# Patient Record
Sex: MALE | Race: BLACK OR AFRICAN AMERICAN | NOT HISPANIC OR LATINO
[De-identification: names, ages, dates, MRNs, and addresses within clinical notes are randomized per-mention and may not be internally consistent; named-entity substitution may affect disease eponyms.]

---

## 2021-02-19 ENCOUNTER — APPOINTMENT (OUTPATIENT)
Dept: VASCULAR SURGERY | Facility: CLINIC | Age: 51
End: 2021-02-19
Payer: MEDICAID

## 2021-02-19 VITALS
WEIGHT: 140 LBS | BODY MASS INDEX: 21.97 KG/M2 | HEART RATE: 67 BPM | HEIGHT: 67 IN | DIASTOLIC BLOOD PRESSURE: 65 MMHG | TEMPERATURE: 97.2 F | SYSTOLIC BLOOD PRESSURE: 104 MMHG | OXYGEN SATURATION: 99 %

## 2021-02-19 PROBLEM — Z00.00 ENCOUNTER FOR PREVENTIVE HEALTH EXAMINATION: Status: ACTIVE | Noted: 2021-02-19

## 2021-02-19 PROCEDURE — 93931 UPPER EXTREMITY STUDY: CPT | Mod: 59

## 2021-02-19 PROCEDURE — 99203 OFFICE O/P NEW LOW 30 MIN: CPT

## 2021-02-19 PROCEDURE — 99072 ADDL SUPL MATRL&STAF TM PHE: CPT

## 2021-02-19 PROCEDURE — 93986 DUP-SCAN HEMO COMPL UNI STD: CPT

## 2021-02-19 NOTE — PHYSICAL EXAM
[2+] : left 2+ [Normal] : normoactive bowel sounds, soft and nontender, no hepatosplenomegaly or masses appreciated [Redness surrounding] : no redness surrounding [Drainage] : no drainage

## 2021-02-19 NOTE — HISTORY OF PRESENT ILLNESS
[FreeTextEntry1] : Patient with history of hypertension, renal failure who has been on dialysis for the past 1 month through a right-sided tunneled catheter.  Patient is left-handed.  No significant cardiac history.  Patient smokes 5 or 6 cigarettes a day.

## 2021-02-19 NOTE — ASSESSMENT
[FreeTextEntry1] : Patient with renal failure.  Plan for right radiocephalic fistula.  Needs medical clearance.

## 2021-07-06 ENCOUNTER — RESULT REVIEW (OUTPATIENT)
Age: 51
End: 2021-07-06

## 2021-07-06 ENCOUNTER — OUTPATIENT (OUTPATIENT)
Dept: OUTPATIENT SERVICES | Facility: HOSPITAL | Age: 51
LOS: 1 days | End: 2021-07-06
Payer: MEDICAID

## 2021-07-06 VITALS
SYSTOLIC BLOOD PRESSURE: 138 MMHG | DIASTOLIC BLOOD PRESSURE: 99 MMHG | WEIGHT: 138.01 LBS | HEIGHT: 65 IN | TEMPERATURE: 98 F | OXYGEN SATURATION: 100 % | HEART RATE: 56 BPM | RESPIRATION RATE: 16 BRPM

## 2021-07-06 DIAGNOSIS — N18.6 END STAGE RENAL DISEASE: ICD-10-CM

## 2021-07-06 DIAGNOSIS — Z98.890 OTHER SPECIFIED POSTPROCEDURAL STATES: Chronic | ICD-10-CM

## 2021-07-06 DIAGNOSIS — I10 ESSENTIAL (PRIMARY) HYPERTENSION: ICD-10-CM

## 2021-07-06 DIAGNOSIS — Z01.818 ENCOUNTER FOR OTHER PREPROCEDURAL EXAMINATION: ICD-10-CM

## 2021-07-06 DIAGNOSIS — Z29.9 ENCOUNTER FOR PROPHYLACTIC MEASURES, UNSPECIFIED: ICD-10-CM

## 2021-07-06 DIAGNOSIS — Z95.828 PRESENCE OF OTHER VASCULAR IMPLANTS AND GRAFTS: Chronic | ICD-10-CM

## 2021-07-06 LAB
ANION GAP SERPL CALC-SCNC: 5 MMOL/L — SIGNIFICANT CHANGE UP (ref 5–17)
BUN SERPL-MCNC: 43 MG/DL — HIGH (ref 7–18)
CALCIUM SERPL-MCNC: 8.9 MG/DL — SIGNIFICANT CHANGE UP (ref 8.4–10.5)
CHLORIDE SERPL-SCNC: 104 MMOL/L — SIGNIFICANT CHANGE UP (ref 96–108)
CO2 SERPL-SCNC: 31 MMOL/L — SIGNIFICANT CHANGE UP (ref 22–31)
CREAT SERPL-MCNC: 9.81 MG/DL — HIGH (ref 0.5–1.3)
GLUCOSE SERPL-MCNC: 50 MG/DL — CRITICAL LOW (ref 70–99)
HCT VFR BLD CALC: 36.6 % — LOW (ref 39–50)
HGB BLD-MCNC: 11.8 G/DL — LOW (ref 13–17)
MCHC RBC-ENTMCNC: 32.2 GM/DL — SIGNIFICANT CHANGE UP (ref 32–36)
MCHC RBC-ENTMCNC: 32.8 PG — SIGNIFICANT CHANGE UP (ref 27–34)
MCV RBC AUTO: 101.7 FL — HIGH (ref 80–100)
NRBC # BLD: 0 /100 WBCS — SIGNIFICANT CHANGE UP (ref 0–0)
PLATELET # BLD AUTO: 138 K/UL — LOW (ref 150–400)
POTASSIUM SERPL-MCNC: 4.6 MMOL/L — SIGNIFICANT CHANGE UP (ref 3.5–5.3)
POTASSIUM SERPL-SCNC: 4.6 MMOL/L — SIGNIFICANT CHANGE UP (ref 3.5–5.3)
RBC # BLD: 3.6 M/UL — LOW (ref 4.2–5.8)
RBC # FLD: 13.2 % — SIGNIFICANT CHANGE UP (ref 10.3–14.5)
SODIUM SERPL-SCNC: 140 MMOL/L — SIGNIFICANT CHANGE UP (ref 135–145)
WBC # BLD: 6.16 K/UL — SIGNIFICANT CHANGE UP (ref 3.8–10.5)
WBC # FLD AUTO: 6.16 K/UL — SIGNIFICANT CHANGE UP (ref 3.8–10.5)

## 2021-07-06 PROCEDURE — 71045 X-RAY EXAM CHEST 1 VIEW: CPT | Mod: 26

## 2021-07-06 PROCEDURE — 36415 COLL VENOUS BLD VENIPUNCTURE: CPT

## 2021-07-06 PROCEDURE — 85027 COMPLETE CBC AUTOMATED: CPT

## 2021-07-06 PROCEDURE — 80048 BASIC METABOLIC PNL TOTAL CA: CPT

## 2021-07-06 PROCEDURE — 87641 MR-STAPH DNA AMP PROBE: CPT

## 2021-07-06 PROCEDURE — 71045 X-RAY EXAM CHEST 1 VIEW: CPT

## 2021-07-06 PROCEDURE — G0463: CPT

## 2021-07-06 PROCEDURE — 87640 STAPH A DNA AMP PROBE: CPT

## 2021-07-06 RX ORDER — SODIUM CHLORIDE 9 MG/ML
3 INJECTION INTRAMUSCULAR; INTRAVENOUS; SUBCUTANEOUS EVERY 8 HOURS
Refills: 0 | Status: DISCONTINUED | OUTPATIENT
Start: 2021-07-15 | End: 2021-07-22

## 2021-07-06 NOTE — H&P PST ADULT - NSICDXFAMILYHX_GEN_ALL_CORE_FT
FAMILY HISTORY:  Mother  Still living? Yes, Estimated age: Age Unknown  FH: stomach cancer, Age at diagnosis: Age Unknown    Sibling  Still living? Yes, Estimated age: Age Unknown  FH: breast cancer, Age at diagnosis: Age Unknown  History of chronic renal failure, Age at diagnosis: Age Unknown

## 2021-07-06 NOTE — H&P PST ADULT - NSICDXPROBLEM_GEN_ALL_CORE_FT
PROBLEM DIAGNOSES  Problem: End stage renal disease  Assessment and Plan: Right Radiocephalic Arteriovenous Fistula Creation    Problem: Need for prophylactic measure  Assessment and Plan: Instruction regarding chlorhexidine soap is given.   Patient verbalized understanding.  Literature also given    Problem: Hypertension  Assessment and Plan: Continue medications       PROBLEM DIAGNOSES  Problem: End stage renal disease  Assessment and Plan: Right Radiocephalic Arteriovenous Fistula Creation    Problem: Need for prophylactic measure  Assessment and Plan: Instruction regarding chlorhexidine soap is given.   Patient verbalized understanding.  Literature also given  Patient is also told that he will be called if the swab result is positive to  mupirocin ointment from the pharmacy    Problem: Hypertension  Assessment and Plan: Continue medications

## 2021-07-06 NOTE — H&P PST ADULT - HISTORY OF PRESENT ILLNESS
49 yo male with history of HTN, ESRD (HD M/W/F), CHF, reports the above.  He is scheduled for :  Right Radiocephalic Arteriovenous Fistula Creation, on 7/15/21

## 2021-07-07 LAB
MRSA PCR RESULT.: SIGNIFICANT CHANGE UP
S AUREUS DNA NOSE QL NAA+PROBE: SIGNIFICANT CHANGE UP

## 2021-07-10 PROBLEM — I10 ESSENTIAL (PRIMARY) HYPERTENSION: Chronic | Status: ACTIVE | Noted: 2021-07-06

## 2021-07-10 PROBLEM — E78.5 HYPERLIPIDEMIA, UNSPECIFIED: Chronic | Status: ACTIVE | Noted: 2021-07-06

## 2021-07-10 PROBLEM — N18.6 END STAGE RENAL DISEASE: Chronic | Status: ACTIVE | Noted: 2021-07-06

## 2021-07-12 ENCOUNTER — APPOINTMENT (OUTPATIENT)
Dept: DISASTER EMERGENCY | Facility: CLINIC | Age: 51
End: 2021-07-12

## 2021-07-13 LAB — SARS-COV-2 N GENE NPH QL NAA+PROBE: NOT DETECTED

## 2021-07-14 ENCOUNTER — TRANSCRIPTION ENCOUNTER (OUTPATIENT)
Age: 51
End: 2021-07-14

## 2021-07-15 ENCOUNTER — OUTPATIENT (OUTPATIENT)
Dept: OUTPATIENT SERVICES | Facility: HOSPITAL | Age: 51
LOS: 1 days | End: 2021-07-15
Payer: MEDICAID

## 2021-07-15 ENCOUNTER — APPOINTMENT (OUTPATIENT)
Dept: VASCULAR SURGERY | Facility: HOSPITAL | Age: 51
End: 2021-07-15

## 2021-07-15 VITALS
RESPIRATION RATE: 18 BRPM | HEART RATE: 56 BPM | OXYGEN SATURATION: 98 % | DIASTOLIC BLOOD PRESSURE: 89 MMHG | TEMPERATURE: 98 F | SYSTOLIC BLOOD PRESSURE: 125 MMHG

## 2021-07-15 VITALS
SYSTOLIC BLOOD PRESSURE: 126 MMHG | DIASTOLIC BLOOD PRESSURE: 81 MMHG | TEMPERATURE: 99 F | WEIGHT: 138.01 LBS | OXYGEN SATURATION: 100 % | HEIGHT: 65 IN | RESPIRATION RATE: 16 BRPM | HEART RATE: 55 BPM

## 2021-07-15 DIAGNOSIS — Z95.828 PRESENCE OF OTHER VASCULAR IMPLANTS AND GRAFTS: Chronic | ICD-10-CM

## 2021-07-15 DIAGNOSIS — N18.6 END STAGE RENAL DISEASE: ICD-10-CM

## 2021-07-15 LAB
ANION GAP SERPL CALC-SCNC: 11 MMOL/L — SIGNIFICANT CHANGE UP (ref 5–17)
BUN SERPL-MCNC: 34 MG/DL — HIGH (ref 7–18)
CALCIUM SERPL-MCNC: 9 MG/DL — SIGNIFICANT CHANGE UP (ref 8.4–10.5)
CHLORIDE SERPL-SCNC: 103 MMOL/L — SIGNIFICANT CHANGE UP (ref 96–108)
CO2 SERPL-SCNC: 28 MMOL/L — SIGNIFICANT CHANGE UP (ref 22–31)
CREAT SERPL-MCNC: 9.52 MG/DL — HIGH (ref 0.5–1.3)
GLUCOSE SERPL-MCNC: 81 MG/DL — SIGNIFICANT CHANGE UP (ref 70–99)
POTASSIUM SERPL-MCNC: 4 MMOL/L — SIGNIFICANT CHANGE UP (ref 3.5–5.3)
POTASSIUM SERPL-SCNC: 4 MMOL/L — SIGNIFICANT CHANGE UP (ref 3.5–5.3)
SODIUM SERPL-SCNC: 142 MMOL/L — SIGNIFICANT CHANGE UP (ref 135–145)

## 2021-07-15 PROCEDURE — 36821 AV FUSION DIRECT ANY SITE: CPT

## 2021-07-15 PROCEDURE — 80048 BASIC METABOLIC PNL TOTAL CA: CPT

## 2021-07-15 PROCEDURE — 36415 COLL VENOUS BLD VENIPUNCTURE: CPT

## 2021-07-15 RX ORDER — ISOSORBIDE MONONITRATE 60 MG/1
1 TABLET, EXTENDED RELEASE ORAL
Qty: 0 | Refills: 0 | DISCHARGE

## 2021-07-15 RX ORDER — SEVELAMER CARBONATE 2400 MG/1
2 POWDER, FOR SUSPENSION ORAL
Qty: 0 | Refills: 0 | DISCHARGE

## 2021-07-15 RX ORDER — TRAMADOL HYDROCHLORIDE 50 MG/1
1 TABLET ORAL
Qty: 12 | Refills: 0
Start: 2021-07-15 | End: 2021-07-17

## 2021-07-15 RX ORDER — CHLORHEXIDINE GLUCONATE 213 G/1000ML
1 SOLUTION TOPICAL ONCE
Refills: 0 | Status: COMPLETED | OUTPATIENT
Start: 2021-07-15 | End: 2021-07-15

## 2021-07-15 RX ORDER — ACETAMINOPHEN 500 MG
1000 TABLET ORAL ONCE
Refills: 0 | Status: DISCONTINUED | OUTPATIENT
Start: 2021-07-15 | End: 2021-07-15

## 2021-07-15 RX ORDER — ONDANSETRON 8 MG/1
4 TABLET, FILM COATED ORAL ONCE
Refills: 0 | Status: DISCONTINUED | OUTPATIENT
Start: 2021-07-15 | End: 2021-07-15

## 2021-07-15 RX ORDER — FENTANYL CITRATE 50 UG/ML
25 INJECTION INTRAVENOUS
Refills: 0 | Status: DISCONTINUED | OUTPATIENT
Start: 2021-07-15 | End: 2021-07-15

## 2021-07-15 RX ORDER — OXYCODONE AND ACETAMINOPHEN 5; 325 MG/1; MG/1
1 TABLET ORAL ONCE
Refills: 0 | Status: DISCONTINUED | OUTPATIENT
Start: 2021-07-15 | End: 2021-07-22

## 2021-07-15 RX ORDER — SIMVASTATIN 20 MG/1
1 TABLET, FILM COATED ORAL
Qty: 0 | Refills: 0 | DISCHARGE

## 2021-07-15 RX ORDER — CARVEDILOL PHOSPHATE 80 MG/1
1 CAPSULE, EXTENDED RELEASE ORAL
Qty: 0 | Refills: 0 | DISCHARGE

## 2021-07-15 RX ADMIN — CHLORHEXIDINE GLUCONATE 1 APPLICATION(S): 213 SOLUTION TOPICAL at 08:37

## 2021-07-15 NOTE — ASU DISCHARGE PLAN (ADULT/PEDIATRIC) - ASU DC SPECIAL INSTRUCTIONSFT
keep dressing in place and dry for 48 hours. Then can remove and shower area normally. Stitches are to remain in place until you follow up with your surgeon. Please follow up in the next 2 weeks.    take your pain medication as prescribed, for pain not controlled by tylenol or ibuprofen.

## 2021-07-15 NOTE — BRIEF OPERATIVE NOTE - NSICDXBRIEFPOSTOP_GEN_ALL_CORE_FT
POST-OP DIAGNOSIS:  Chronic kidney disease, unspecified CKD stage 15-Jul-2021 12:12:17  Gregorio Sullivan

## 2021-07-15 NOTE — ASU DISCHARGE PLAN (ADULT/PEDIATRIC) - CARE PROVIDER_API CALL
Koko Cooley)  Vascular Surgery  [address] [facility], [address]  [place] [address]  Phone: [phone]  Fax: [phone]  Established Patient  Follow Up Time: 2 weeks

## 2021-07-15 NOTE — BRIEF OPERATIVE NOTE - NSICDXBRIEFPREOP_GEN_ALL_CORE_FT
PRE-OP DIAGNOSIS:  Chronic kidney disease, unspecified CKD stage 15-Jul-2021 12:12:13  Gregorio Sullivan

## 2021-07-15 NOTE — ASU PREOP CHECKLIST - NS PREOP CHK TEST_COVID_DT_GEN_ALL_CORE
Comment: I favor Herpes Simplex.  Declined oral treatment, she requests topical.  Start topical acyclovir and f/u in 1 wk, if it worsens prior to her f/u she will call.  She v/u. Detail Level: Simple 12-Jul-2021 08:24

## 2021-07-22 ENCOUNTER — APPOINTMENT (OUTPATIENT)
Dept: VASCULAR SURGERY | Facility: CLINIC | Age: 51
End: 2021-07-22
Payer: MEDICAID

## 2021-07-22 VITALS — HEART RATE: 56 BPM | SYSTOLIC BLOOD PRESSURE: 106 MMHG | DIASTOLIC BLOOD PRESSURE: 76 MMHG

## 2021-07-22 PROCEDURE — 93990 DOPPLER FLOW TESTING: CPT

## 2021-07-22 PROCEDURE — 99024 POSTOP FOLLOW-UP VISIT: CPT

## 2021-07-22 PROCEDURE — 99213 OFFICE O/P EST LOW 20 MIN: CPT

## 2021-07-22 NOTE — PHYSICAL EXAM
[JVD] : no jugular venous distention  [Ankle Swelling (On Exam)] : not present [Varicose Veins Of Lower Extremities] : not present [] : not present [de-identified] : Appears well [FreeTextEntry1] : No thrill in right arm fistula

## 2021-07-22 NOTE — DISCUSSION/SUMMARY
[FreeTextEntry1] : The patient underwent a duplex study which shows thrombosis of the fistula.  There appears to be thrombus only in the proximal portion of the vein.  At this time will have patient reevaluated in 1 week with suture removal.  At that time he probably will be scheduled for attempted endovascular maturation/thrombectomy of fistula.

## 2021-07-22 NOTE — REASON FOR VISIT
[de-identified] : Creation right radiocephalic AV fistula [de-identified] : Patient is status post creation of right radiocephalic AV fistula.  Currently on hemodialysis via permacath.  The patient's dialysis unit called and stated there was no thrill in the fistula.  He is here for evaluation.

## 2021-07-30 ENCOUNTER — APPOINTMENT (OUTPATIENT)
Dept: VASCULAR SURGERY | Facility: CLINIC | Age: 51
End: 2021-07-30
Payer: MEDICAID

## 2021-07-30 VITALS — TEMPERATURE: 96.6 F

## 2021-07-30 PROCEDURE — 99024 POSTOP FOLLOW-UP VISIT: CPT

## 2021-07-30 NOTE — REASON FOR VISIT
[de-identified] : Status post right radiocephalic fistula, incision is clean.  No thrill.  Plan for fistulogram and thrombectomy next week.

## 2021-07-31 LAB — SARS-COV-2 N GENE NPH QL NAA+PROBE: NOT DETECTED

## 2021-08-02 PROBLEM — I50.9 CHF (CONGESTIVE HEART FAILURE): Status: ACTIVE | Noted: 2021-08-02

## 2021-08-02 PROBLEM — I10 HTN (HYPERTENSION): Status: ACTIVE | Noted: 2021-08-02

## 2021-08-02 PROBLEM — T82.49XA CLOTTED DIALYSIS ACCESS: Status: ACTIVE | Noted: 2021-08-02

## 2021-08-03 ENCOUNTER — RESULT REVIEW (OUTPATIENT)
Age: 51
End: 2021-08-03

## 2021-08-03 ENCOUNTER — APPOINTMENT (OUTPATIENT)
Dept: ENDOVASCULAR SURGERY | Facility: CLINIC | Age: 51
End: 2021-08-03
Payer: MEDICAID

## 2021-08-03 VITALS
TEMPERATURE: 98 F | HEIGHT: 66 IN | SYSTOLIC BLOOD PRESSURE: 157 MMHG | OXYGEN SATURATION: 98 % | DIASTOLIC BLOOD PRESSURE: 92 MMHG | BODY MASS INDEX: 22.5 KG/M2 | RESPIRATION RATE: 20 BRPM | WEIGHT: 140 LBS | HEART RATE: 55 BPM

## 2021-08-03 DIAGNOSIS — T82.49XA OTHER COMPLICATION OF VASCULAR DIALYSIS CATHETER, INITIAL ENCOUNTER: ICD-10-CM

## 2021-08-03 DIAGNOSIS — I50.9 HEART FAILURE, UNSPECIFIED: ICD-10-CM

## 2021-08-03 DIAGNOSIS — E78.5 HYPERLIPIDEMIA, UNSPECIFIED: ICD-10-CM

## 2021-08-03 DIAGNOSIS — I10 ESSENTIAL (PRIMARY) HYPERTENSION: ICD-10-CM

## 2021-08-03 PROCEDURE — 36905Z: CUSTOM | Mod: 58

## 2021-08-03 PROCEDURE — 76937 US GUIDE VASCULAR ACCESS: CPT

## 2021-08-03 PROCEDURE — 36215Z: CUSTOM | Mod: 59,58

## 2021-08-17 ENCOUNTER — NON-APPOINTMENT (OUTPATIENT)
Age: 51
End: 2021-08-17

## 2021-08-17 ENCOUNTER — APPOINTMENT (OUTPATIENT)
Dept: ENDOVASCULAR SURGERY | Facility: CLINIC | Age: 51
End: 2021-08-17
Payer: MEDICAID

## 2021-08-17 ENCOUNTER — RESULT REVIEW (OUTPATIENT)
Age: 51
End: 2021-08-17

## 2021-08-17 VITALS
TEMPERATURE: 98 F | SYSTOLIC BLOOD PRESSURE: 131 MMHG | DIASTOLIC BLOOD PRESSURE: 84 MMHG | BODY MASS INDEX: 21.97 KG/M2 | WEIGHT: 140 LBS | HEIGHT: 67 IN | HEART RATE: 51 BPM | OXYGEN SATURATION: 99 % | RESPIRATION RATE: 20 BRPM

## 2021-08-17 PROCEDURE — 76937 US GUIDE VASCULAR ACCESS: CPT

## 2021-08-17 PROCEDURE — 36909Z: CUSTOM | Mod: 58

## 2021-08-17 PROCEDURE — 36011Z: CUSTOM | Mod: 59,58

## 2021-08-17 PROCEDURE — 36902Z: CUSTOM | Mod: 59,58

## 2021-08-17 RX ORDER — ISOSORBIDE MONONITRATE 30 MG
30 TABLET, EXTENDED RELEASE 24 HR ORAL
Refills: 0 | Status: ACTIVE | COMMUNITY

## 2021-08-17 RX ORDER — CARVEDILOL 3.12 MG/1
TABLET, FILM COATED ORAL
Refills: 0 | Status: ACTIVE | COMMUNITY

## 2021-09-01 NOTE — HISTORY OF PRESENT ILLNESS
[] : right radiocephalic fistula [FreeTextEntry1] : 7/15/2021 Dr. Cooley [FreeTextEntry4] : yesterday vis right tunneled catheter [FreeTextEntry5] : last night 10pm [FreeTextEntry6] : Dr Rodrigues

## 2021-09-01 NOTE — PAST MEDICAL HISTORY
[Increasing age ( >40 years old)] : Increasing age ( >40 years old) [No therapy indicated for cases scheduled for less than one hour] : No therapy indicated for cases scheduled for less than one hour. [FreeTextEntry1] : Malignant Hyperthermia Screening Tool and Risk of Bleeding Assessment\par Mr. SANJAY VALDERRAMA denies family history of unexpected death following Anesthesia or Exercise.\par Denies Family history of Malignant Hyperthermia, Muscle or Neuromuscular disorder and High Temperature following exercise.\par \par Mr. SANJAY VALDERRAMA denies history of Muscle Spasm, Dark or Chocolate - Colored urine and Unanticipated fever immediately following anesthesia or serious exercise. \par Mr. VALDERRAMA also denies bleeding tendencies/ Risks of Bleeding.

## 2021-09-01 NOTE — PROCEDURE
[Resume diet] : resume diet [Site check for bleeding/hematoma] : Site check for bleeding/hematoma [Vital signs on admission the q 15 mins x2] : Vital signs on admission the q 15 mins x2 [FreeTextEntry1] : right arm fistula/fistulogram/angioplasty/coil

## 2021-09-01 NOTE — PROCEDURE
[Resume diet] : resume diet [Site check for bleeding/hematoma/thrill/bruit] : Site check for bleeding/hematoma/thrill/bruit [Vital signs on admission the q 15 mins x2] : Vital signs on admission the q 15 mins x2 [FreeTextEntry1] : right arm fistula thrombectomy

## 2021-09-01 NOTE — HISTORY OF PRESENT ILLNESS
[] : right internal jugular tunneled catheter [FreeTextEntry1] : Dr. Cooley 7/15/21 [FreeTextEntry2] : tunneled catheter [FreeTextEntry4] : Yesterday  [FreeTextEntry5] : Yesterday 10:30 [FreeTextEntry6] : Dr. Lamb

## 2021-09-01 NOTE — PAST MEDICAL HISTORY
[Increasing age ( >40 years old)] : Increasing age ( >40 years old) [No therapy indicated for cases scheduled for less than one hour] : No therapy indicated for cases scheduled for less than one hour. [Congestive Heart Failure] : Congestive Heart Failure [FreeTextEntry1] : Malignant Hyperthermia Screening Tool and Risk of Bleeding Assessment\par Mr. SANJAY VALDERRAMA denies family history of unexpected death following Anesthesia or Exercise.\par Denies Family history of Malignant Hyperthermia, Muscle or Neuromuscular disorder and High Temperature following exercise.\par \par Mr. SANJAY VALDERRAMA denies history of Muscle Spasm, Dark or Chocolate - Colored urine and Unanticipated fever immediately following anesthesia or serious exercise. \par Mr. VALDERRAMA also denies bleeding tendencies/ Risks of Bleeding.\par

## 2021-09-03 ENCOUNTER — APPOINTMENT (OUTPATIENT)
Dept: DISASTER EMERGENCY | Facility: CLINIC | Age: 51
End: 2021-09-03

## 2021-09-04 LAB — SARS-COV-2 N GENE NPH QL NAA+PROBE: NOT DETECTED

## 2021-09-07 ENCOUNTER — APPOINTMENT (OUTPATIENT)
Dept: ENDOVASCULAR SURGERY | Facility: CLINIC | Age: 51
End: 2021-09-07
Payer: MEDICAID

## 2021-09-07 ENCOUNTER — RESULT REVIEW (OUTPATIENT)
Age: 51
End: 2021-09-07

## 2021-09-07 VITALS
TEMPERATURE: 98 F | HEIGHT: 67 IN | WEIGHT: 140 LBS | RESPIRATION RATE: 20 BRPM | BODY MASS INDEX: 21.97 KG/M2 | OXYGEN SATURATION: 100 % | DIASTOLIC BLOOD PRESSURE: 80 MMHG | HEART RATE: 53 BPM | SYSTOLIC BLOOD PRESSURE: 124 MMHG

## 2021-09-07 PROCEDURE — 36902Z: CUSTOM | Mod: 58

## 2021-09-07 NOTE — HISTORY OF PRESENT ILLNESS
[] : right internal jugular tunneled catheter [FreeTextEntry1] : Dr. Cooley 7/15/21 [FreeTextEntry2] : tunneled catheter [FreeTextEntry4] : Yesterday via catheter [FreeTextEntry5] : Yesterday 8pm [FreeTextEntry6] : Dr. Lamb

## 2021-09-07 NOTE — PROCEDURE
[Resume diet] : resume diet [Site check for bleeding/hematoma] : Site check for bleeding/hematoma [Vital signs on admission the q 15 mins x2] : Vital signs on admission the q 15 mins x2 [FreeTextEntry1] : right arm fistula/fistulogram/angioplasty

## 2021-10-11 DIAGNOSIS — Z01.818 ENCOUNTER FOR OTHER PREPROCEDURAL EXAMINATION: ICD-10-CM

## 2021-10-12 ENCOUNTER — LABORATORY RESULT (OUTPATIENT)
Age: 51
End: 2021-10-12

## 2021-10-12 ENCOUNTER — APPOINTMENT (OUTPATIENT)
Dept: DISASTER EMERGENCY | Facility: CLINIC | Age: 51
End: 2021-10-12

## 2021-10-13 NOTE — REASON FOR VISIT
[Other ___] : a [unfilled] visit for [Difficult Cannulation] : difficult cannulation [Non-Maturing Fistula] : non-maturing fistula

## 2021-10-14 ENCOUNTER — RESULT REVIEW (OUTPATIENT)
Age: 51
End: 2021-10-14

## 2021-10-14 ENCOUNTER — APPOINTMENT (OUTPATIENT)
Dept: ENDOVASCULAR SURGERY | Facility: CLINIC | Age: 51
End: 2021-10-14
Payer: MEDICAID

## 2021-10-14 VITALS
TEMPERATURE: 98.2 F | SYSTOLIC BLOOD PRESSURE: 115 MMHG | WEIGHT: 140 LBS | DIASTOLIC BLOOD PRESSURE: 67 MMHG | HEIGHT: 68 IN | OXYGEN SATURATION: 99 % | HEART RATE: 58 BPM | BODY MASS INDEX: 21.22 KG/M2 | RESPIRATION RATE: 20 BRPM

## 2021-10-14 PROCEDURE — 36909Z: CUSTOM | Mod: 58

## 2021-10-14 PROCEDURE — 36902Z: CUSTOM | Mod: 59,58

## 2021-10-14 RX ORDER — SEVELAMER CARBONATE 800 MG/1
TABLET, FILM COATED ORAL
Refills: 0 | Status: ACTIVE | COMMUNITY

## 2021-10-14 RX ORDER — CHROMIUM 200 MCG
TABLET ORAL
Refills: 0 | Status: ACTIVE | COMMUNITY

## 2021-10-14 NOTE — PAST MEDICAL HISTORY
[Increasing age ( >40 years old)] : Increasing age ( >40 years old) [No therapy indicated for cases scheduled for less than one hour] : No therapy indicated for cases scheduled for less than one hour. [FreeTextEntry1] : Malignant Hyperthermia Screening Tool and Risk of Bleeding Assessment\par Mr. SANJAY VALDERRMAA denies family history of unexpected death following Anesthesia or Exercise.\par Denies Family history of Malignant Hyperthermia, Muscle or Neuromuscular disorder and High Temperature following exercise.\par \par Mr. SANJAY VALDERRAMA denies history of Muscle Spasm, Dark or Chocolate - Colored urine and Unanticipated fever immediately following anesthesia or serious exercise. \par Mr. VALDERRAMA also denies bleeding tendencies/ Risks of Bleeding.

## 2021-10-14 NOTE — HISTORY OF PRESENT ILLNESS
[] : right internal jugular tunneled catheter [FreeTextEntry1] : Dr. Cooley 7/15/21 [FreeTextEntry2] : tunneled catheter [FreeTextEntry4] : Yesterday  [FreeTextEntry5] : Yesterday at 11pm [FreeTextEntry6] : Dr. Lamb

## 2021-11-09 ENCOUNTER — APPOINTMENT (OUTPATIENT)
Dept: ENDOVASCULAR SURGERY | Facility: CLINIC | Age: 51
End: 2021-11-09
Payer: MEDICAID

## 2021-11-09 PROCEDURE — 36589 REMOVAL TUNNELED CV CATH: CPT

## 2021-12-02 ENCOUNTER — RESULT REVIEW (OUTPATIENT)
Age: 51
End: 2021-12-02

## 2021-12-02 ENCOUNTER — APPOINTMENT (OUTPATIENT)
Dept: ENDOVASCULAR SURGERY | Facility: CLINIC | Age: 51
End: 2021-12-02
Payer: MEDICAID

## 2021-12-02 VITALS
BODY MASS INDEX: 21.22 KG/M2 | SYSTOLIC BLOOD PRESSURE: 142 MMHG | HEART RATE: 63 BPM | DIASTOLIC BLOOD PRESSURE: 91 MMHG | WEIGHT: 140 LBS | TEMPERATURE: 98.1 F | OXYGEN SATURATION: 98 % | HEIGHT: 68 IN | RESPIRATION RATE: 16 BRPM

## 2021-12-02 PROCEDURE — 36902Z: CUSTOM

## 2021-12-02 RX ORDER — SIMVASTATIN 80 MG/1
TABLET, FILM COATED ORAL
Refills: 0 | Status: ACTIVE | COMMUNITY

## 2021-12-02 NOTE — ASSESSMENT
[FreeTextEntry1] : referred from dialysis for difficult cannulation, plan for fistulogram and possible interventilon

## 2021-12-02 NOTE — HISTORY OF PRESENT ILLNESS
[] : right internal jugular tunneled catheter [FreeTextEntry1] : Dr. Cooley 7/15/21 [FreeTextEntry2] : tunneled catheter [FreeTextEntry4] : 12/1/2021 [FreeTextEntry5] : Yesterday at 10pm [FreeTextEntry6] : Dr. Vela

## 2022-03-01 ENCOUNTER — APPOINTMENT (OUTPATIENT)
Dept: VASCULAR SURGERY | Facility: CLINIC | Age: 52
End: 2022-03-01
Payer: MEDICAID

## 2022-03-01 PROCEDURE — 93990 DOPPLER FLOW TESTING: CPT

## 2022-03-01 PROCEDURE — 99213 OFFICE O/P EST LOW 20 MIN: CPT

## 2022-03-01 NOTE — PHYSICAL EXAM
[Thrill] : thrill [Pulsatile Thrill] : no pulsatile thrill [Aneurysm] : no aneurysm [Hand well perfused] : hand well perfused [Warm Extremities] : warm extremities [Normal] : normoactive bowel sounds, soft and nontender, no hepatosplenomegaly or masses appreciated

## 2022-03-01 NOTE — HISTORY OF PRESENT ILLNESS
[FreeTextEntry1] : 51-year-old gentleman currently on hemodialysis via right arm radiocephalic AV fistula.  Access was created approximately 8 months ago.  Patient states that intermittently there is difficulty with cannulation.  He is here for evaluation. [] : right radiocephalic fistula

## 2022-03-24 ENCOUNTER — APPOINTMENT (OUTPATIENT)
Dept: ENDOVASCULAR SURGERY | Facility: CLINIC | Age: 52
End: 2022-03-24
Payer: MEDICAID

## 2022-03-25 ENCOUNTER — APPOINTMENT (OUTPATIENT)
Dept: ENDOVASCULAR SURGERY | Facility: CLINIC | Age: 52
End: 2022-03-25
Payer: MEDICAID

## 2022-04-01 ENCOUNTER — APPOINTMENT (OUTPATIENT)
Dept: ENDOVASCULAR SURGERY | Facility: CLINIC | Age: 52
End: 2022-04-01
Payer: MEDICAID

## 2022-04-15 ENCOUNTER — APPOINTMENT (OUTPATIENT)
Dept: ENDOVASCULAR SURGERY | Facility: CLINIC | Age: 52
End: 2022-04-15
Payer: MEDICAID

## 2022-04-22 ENCOUNTER — RESULT REVIEW (OUTPATIENT)
Age: 52
End: 2022-04-22

## 2022-04-22 ENCOUNTER — APPOINTMENT (OUTPATIENT)
Dept: ENDOVASCULAR SURGERY | Facility: CLINIC | Age: 52
End: 2022-04-22
Payer: MEDICAID

## 2022-04-22 VITALS
RESPIRATION RATE: 16 BRPM | HEART RATE: 63 BPM | WEIGHT: 140 LBS | BODY MASS INDEX: 21.22 KG/M2 | TEMPERATURE: 98.8 F | SYSTOLIC BLOOD PRESSURE: 146 MMHG | DIASTOLIC BLOOD PRESSURE: 96 MMHG | HEIGHT: 68 IN

## 2022-04-22 DIAGNOSIS — T82.898A OTHER SPECIFIED COMPLICATION OF VASCULAR PROSTHETIC DEVICES, IMPLANTS AND GRAFTS, INITIAL ENCOUNTER: ICD-10-CM

## 2022-04-22 DIAGNOSIS — N18.6 END STAGE RENAL DISEASE: ICD-10-CM

## 2022-04-22 DIAGNOSIS — Z99.2 END STAGE RENAL DISEASE: ICD-10-CM

## 2022-04-22 PROCEDURE — 36902Z: CUSTOM

## 2022-04-22 NOTE — PAST MEDICAL HISTORY
[FreeTextEntry1] : Malignant Hyperthermia Screening Tool and Risk of Bleeding Assessment\par Mr. SANJAY VALDERRAMA denies family history of unexpected death following Anesthesia or Exercise.\par Denies Family history of Malignant Hyperthermia, Muscle or Neuromuscular disorder and High Temperature following exercise.\par \par Mr. SANJAY VALDERRAMA denies history of Muscle Spasm, Dark or Chocolate - Colored urine and Unanticipated fever immediately following anesthesia or serious exercise. \par Mr. VALDERRAMA also denies bleeding tendencies/ Risks of Bleeding.

## 2022-04-22 NOTE — HISTORY OF PRESENT ILLNESS
[FreeTextEntry1] : Dr. Cooley 7/15/21 [FreeTextEntry2] : tunneled catheter [FreeTextEntry4] : 4/20/2022 [FreeTextEntry5] : Yesterday at 9pm [FreeTextEntry6] : Dr. Hutson

## 2024-01-22 ENCOUNTER — APPOINTMENT (OUTPATIENT)
Dept: VASCULAR SURGERY | Facility: CLINIC | Age: 54
End: 2024-01-22
Payer: MEDICAID

## 2024-01-22 PROCEDURE — 99214 OFFICE O/P EST MOD 30 MIN: CPT

## 2024-01-22 NOTE — PHYSICAL EXAM
[Thrill] : thrill [Aneurysm] : no aneurysm [Normal] : normoactive bowel sounds, soft and nontender, no hepatosplenomegaly or masses appreciated

## 2024-01-22 NOTE — ASSESSMENT
[FreeTextEntry1] : Patient with renal failure on dialysis through a right radiocephalic fistula.  Difficult cannulation.  Poor flow.  Will schedule the patient for fistulogram and angioplasty.

## 2024-02-07 ENCOUNTER — APPOINTMENT (OUTPATIENT)
Dept: ENDOVASCULAR SURGERY | Facility: CLINIC | Age: 54
End: 2024-02-07
Payer: MEDICAID

## 2024-02-07 ENCOUNTER — RESULT REVIEW (OUTPATIENT)
Age: 54
End: 2024-02-07

## 2024-02-07 VITALS
RESPIRATION RATE: 16 BRPM | WEIGHT: 140 LBS | HEIGHT: 68 IN | HEART RATE: 58 BPM | SYSTOLIC BLOOD PRESSURE: 201 MMHG | DIASTOLIC BLOOD PRESSURE: 115 MMHG | TEMPERATURE: 98.1 F | OXYGEN SATURATION: 99 % | BODY MASS INDEX: 21.22 KG/M2

## 2024-02-07 PROCEDURE — 36216Z: CUSTOM | Mod: 59

## 2024-02-07 PROCEDURE — 36903Z: CUSTOM

## 2024-02-07 PROCEDURE — 99213 OFFICE O/P EST LOW 20 MIN: CPT | Mod: 25

## 2024-02-07 PROCEDURE — 37197Z: CUSTOM | Mod: 59

## 2024-02-07 PROCEDURE — 76937 US GUIDE VASCULAR ACCESS: CPT

## 2024-02-09 NOTE — HISTORY OF PRESENT ILLNESS
[] : right radiocephalic fistula [FreeTextEntry1] : ? creation [FreeTextEntry4] : Saturday 2/3 [FreeTextEntry5] : yesterday 7pm [FreeTextEntry6] : Madai

## 2024-02-09 NOTE — PAST MEDICAL HISTORY
[Increasing age ( >40 years old)] : Increasing age ( >40 years old) [Congestive Heart Failure] : Congestive Heart Failure [No therapy indicated for cases scheduled for less than one hour] : No therapy indicated for cases scheduled for less than one hour. [FreeTextEntry1] : Malignant Hyperthermis (MH) Screening Tool and Risk of Bleeding Assessement Mr. SANJAY VALDERRAMA  denies family history of unexpected death following Anesthesia or Exercise. Denies Family history of Malignant Hyperthermia, Muscle or Neuromuscular disorder and High Temperature following exercise.  Mr. SANJAY VALDERRAMA denies history of Muscle Spasm, Dark or Chocolate - Colored urine and Unanticipated fever immediately following anesthesia or serious exercise.  Mr. VALDERRAMA  also denies bleeding tendencies/ Risks of Bleeding

## 2024-02-09 NOTE — PROCEDURE
[Resume diet] : resume diet [Site check for bleeding/hematoma/thrill/bruit] : Site check for bleeding/hematoma/thrill/bruit [Vital signs on admission the q 15 mins x2] : Vital signs on admission the q 15 mins x2 [FreeTextEntry1] : right fistula/fitulogram, angioplasty

## 2024-02-22 ENCOUNTER — APPOINTMENT (OUTPATIENT)
Dept: VASCULAR SURGERY | Facility: HOSPITAL | Age: 54
End: 2024-02-22

## 2024-02-22 ENCOUNTER — APPOINTMENT (OUTPATIENT)
Dept: VASCULAR SURGERY | Facility: CLINIC | Age: 54
End: 2024-02-22

## 2024-02-22 ENCOUNTER — APPOINTMENT (OUTPATIENT)
Dept: VASCULAR SURGERY | Facility: CLINIC | Age: 54
End: 2024-02-22
Payer: MEDICAID

## 2024-02-22 PROCEDURE — 99213 OFFICE O/P EST LOW 20 MIN: CPT

## 2024-02-22 PROCEDURE — 93990 DOPPLER FLOW TESTING: CPT

## 2024-02-22 NOTE — ASSESSMENT
[FreeTextEntry1] : Status post angioplasty and stenting of the left radial artery for thrombosed left radial artery and poorly functioning AV fistula.  The fistula is widely patent on duplex.  Excellent flow.  No intervention necessary despite question of difficult cannulation.  We will contact the dialysis unit to try accessing the fistula again.

## 2024-02-22 NOTE — PHYSICAL EXAM
[Thrill] : thrill [Pulsatile Thrill] : no pulsatile thrill [Hand well perfused] : hand well perfused [Normal] : normoactive bowel sounds, soft and nontender, no hepatosplenomegaly or masses appreciated

## 2024-02-28 ENCOUNTER — APPOINTMENT (OUTPATIENT)
Dept: VASCULAR SURGERY | Facility: CLINIC | Age: 54
End: 2024-02-28

## 2024-07-01 ENCOUNTER — APPOINTMENT (OUTPATIENT)
Dept: VASCULAR SURGERY | Facility: CLINIC | Age: 54
End: 2024-07-01

## 2024-07-05 NOTE — ASU DISCHARGE PLAN (ADULT/PEDIATRIC) - DIET/FLUID RESTRICTION
Prep Survey      Flowsheet Row Responses   [facility] facility patient discharged from? Non-[facility]   Is LACE score < 7 ? Non-BH Discharge   Eligibility Not Eligible   What are the reasons patient is not eligible? Other  [Discharge from [facility]]   Does the patient have one of the following disease processes/diagnoses(primary or secondary)? Other   Prep survey completed? Yes            KEN ALEXANDRE - Registered Nurse          
No change

## 2024-11-20 ENCOUNTER — APPOINTMENT (OUTPATIENT)
Dept: VASCULAR SURGERY | Facility: CLINIC | Age: 54
End: 2024-11-20
Payer: MEDICAID

## 2024-11-20 PROCEDURE — 93990 DOPPLER FLOW TESTING: CPT

## 2025-02-21 ENCOUNTER — APPOINTMENT (OUTPATIENT)
Dept: ENDOVASCULAR SURGERY | Facility: CLINIC | Age: 55
End: 2025-02-21

## 2025-02-21 DIAGNOSIS — T82.898A OTHER SPECIFIED COMPLICATION OF VASCULAR PROSTHETIC DEVICES, IMPLANTS AND GRAFTS, INITIAL ENCOUNTER: ICD-10-CM

## 2025-02-26 ENCOUNTER — APPOINTMENT (OUTPATIENT)
Dept: ENDOVASCULAR SURGERY | Facility: CLINIC | Age: 55
End: 2025-02-26

## 2025-02-26 ENCOUNTER — RESULT REVIEW (OUTPATIENT)
Age: 55
End: 2025-02-26

## 2025-02-26 VITALS
BODY MASS INDEX: 21.97 KG/M2 | HEART RATE: 78 BPM | WEIGHT: 140 LBS | TEMPERATURE: 98.1 F | RESPIRATION RATE: 18 BRPM | SYSTOLIC BLOOD PRESSURE: 197 MMHG | HEIGHT: 67 IN | OXYGEN SATURATION: 97 % | DIASTOLIC BLOOD PRESSURE: 121 MMHG

## 2025-02-26 DIAGNOSIS — Z99.2 END STAGE RENAL DISEASE: ICD-10-CM

## 2025-02-26 DIAGNOSIS — N18.6 END STAGE RENAL DISEASE: ICD-10-CM

## 2025-02-26 PROCEDURE — 36901Z: CUSTOM

## 2025-05-19 ENCOUNTER — APPOINTMENT (OUTPATIENT)
Dept: VASCULAR SURGERY | Facility: CLINIC | Age: 55
End: 2025-05-19

## 2025-05-28 ENCOUNTER — APPOINTMENT (OUTPATIENT)
Dept: VASCULAR SURGERY | Facility: CLINIC | Age: 55
End: 2025-05-28

## 2025-05-28 DIAGNOSIS — T82.898A OTHER SPECIFIED COMPLICATION OF VASCULAR PROSTHETIC DEVICES, IMPLANTS AND GRAFTS, INITIAL ENCOUNTER: ICD-10-CM

## 2025-05-28 PROCEDURE — 93990 DOPPLER FLOW TESTING: CPT

## 2025-05-28 PROCEDURE — 99215 OFFICE O/P EST HI 40 MIN: CPT

## 2025-05-28 PROCEDURE — G2211 COMPLEX E/M VISIT ADD ON: CPT | Mod: NC

## 2025-05-29 ENCOUNTER — NON-APPOINTMENT (OUTPATIENT)
Age: 55
End: 2025-05-29

## 2025-05-29 ENCOUNTER — APPOINTMENT (OUTPATIENT)
Dept: ENDOVASCULAR SURGERY | Facility: CLINIC | Age: 55
End: 2025-05-29
Payer: MEDICAID

## 2025-05-30 ENCOUNTER — RESULT REVIEW (OUTPATIENT)
Age: 55
End: 2025-05-30

## 2025-05-30 ENCOUNTER — APPOINTMENT (OUTPATIENT)
Dept: ENDOVASCULAR SURGERY | Facility: CLINIC | Age: 55
End: 2025-05-30
Payer: MEDICAID

## 2025-05-30 PROCEDURE — 36215Z: CUSTOM | Mod: 59

## 2025-05-30 PROCEDURE — 36902Z: CUSTOM

## 2025-09-08 ENCOUNTER — APPOINTMENT (OUTPATIENT)
Dept: VASCULAR SURGERY | Facility: CLINIC | Age: 55
End: 2025-09-08
Payer: MEDICAID

## 2025-09-08 DIAGNOSIS — Z99.2 DEPENDENCE ON RENAL DIALYSIS: ICD-10-CM

## 2025-09-08 DIAGNOSIS — Z99.2 END STAGE RENAL DISEASE: ICD-10-CM

## 2025-09-08 DIAGNOSIS — N18.6 END STAGE RENAL DISEASE: ICD-10-CM

## 2025-09-08 DIAGNOSIS — Z98.890 OTHER SPECIFIED POSTPROCEDURAL STATES: ICD-10-CM

## 2025-09-08 PROCEDURE — 93990 DOPPLER FLOW TESTING: CPT

## 2025-09-08 PROCEDURE — 99215 OFFICE O/P EST HI 40 MIN: CPT
